# Patient Record
Sex: FEMALE | Race: WHITE | NOT HISPANIC OR LATINO
[De-identification: names, ages, dates, MRNs, and addresses within clinical notes are randomized per-mention and may not be internally consistent; named-entity substitution may affect disease eponyms.]

---

## 2017-01-09 ENCOUNTER — APPOINTMENT (OUTPATIENT)
Dept: CARDIOTHORACIC SURGERY | Facility: CLINIC | Age: 82
End: 2017-01-09

## 2017-01-09 ENCOUNTER — OUTPATIENT (OUTPATIENT)
Dept: OUTPATIENT SERVICES | Facility: HOSPITAL | Age: 82
LOS: 1 days | End: 2017-01-09
Payer: COMMERCIAL

## 2017-01-09 VITALS
SYSTOLIC BLOOD PRESSURE: 171 MMHG | DIASTOLIC BLOOD PRESSURE: 71 MMHG | RESPIRATION RATE: 17 BRPM | BODY MASS INDEX: 20.96 KG/M2 | TEMPERATURE: 97 F | HEIGHT: 59 IN | OXYGEN SATURATION: 98 % | WEIGHT: 104 LBS | HEART RATE: 66 BPM

## 2017-01-09 DIAGNOSIS — Z82.3 FAMILY HISTORY OF STROKE: ICD-10-CM

## 2017-01-09 DIAGNOSIS — R07.9 CHEST PAIN, UNSPECIFIED: ICD-10-CM

## 2017-01-09 DIAGNOSIS — I73.9 PERIPHERAL VASCULAR DISEASE, UNSPECIFIED: ICD-10-CM

## 2017-01-09 DIAGNOSIS — I25.10 ATHEROSCLEROTIC HEART DISEASE OF NATIVE CORONARY ARTERY W/OUT ANGINA PECTORIS: ICD-10-CM

## 2017-01-09 DIAGNOSIS — Z86.79 PERSONAL HISTORY OF OTHER DISEASES OF THE CIRCULATORY SYSTEM: ICD-10-CM

## 2017-01-09 DIAGNOSIS — Z78.9 OTHER SPECIFIED HEALTH STATUS: ICD-10-CM

## 2017-01-09 DIAGNOSIS — I50.32 CHRONIC DIASTOLIC (CONGESTIVE) HEART FAILURE: ICD-10-CM

## 2017-01-09 DIAGNOSIS — Z98.61 ATHEROSCLEROTIC HEART DISEASE OF NATIVE CORONARY ARTERY W/OUT ANGINA PECTORIS: ICD-10-CM

## 2017-01-09 DIAGNOSIS — Z86.39 PERSONAL HISTORY OF OTHER ENDOCRINE, NUTRITIONAL AND METABOLIC DISEASE: ICD-10-CM

## 2017-01-09 DIAGNOSIS — I35.0 NONRHEUMATIC AORTIC (VALVE) STENOSIS: ICD-10-CM

## 2017-01-09 PROCEDURE — 93306 TTE W/DOPPLER COMPLETE: CPT

## 2017-01-09 PROCEDURE — 93306 TTE W/DOPPLER COMPLETE: CPT | Mod: 26

## 2017-01-10 PROBLEM — I73.9 PERIPHERAL ARTERY DISEASE: Status: RESOLVED | Noted: 2017-01-10 | Resolved: 2017-01-10

## 2017-01-10 PROBLEM — Z86.79 HISTORY OF HYPERTENSION: Status: RESOLVED | Noted: 2017-01-10 | Resolved: 2017-01-10

## 2017-01-10 PROBLEM — I25.10 CAD S/P PERCUTANEOUS CORONARY ANGIOPLASTY: Status: RESOLVED | Noted: 2017-01-10 | Resolved: 2017-01-10

## 2017-01-10 PROBLEM — Z78.9 SOCIAL ALCOHOL USE: Status: ACTIVE | Noted: 2017-01-10

## 2017-01-10 PROBLEM — I35.0 AORTIC STENOSIS: Status: ACTIVE | Noted: 2017-01-10

## 2017-01-10 PROBLEM — I50.32 CHRONIC DIASTOLIC CONGESTIVE HEART FAILURE: Status: ACTIVE | Noted: 2017-01-10

## 2017-01-10 PROBLEM — Z82.3 FAMILY HISTORY OF CEREBROVASCULAR ACCIDENT (CVA): Status: ACTIVE | Noted: 2017-01-10

## 2017-01-10 PROBLEM — Z86.39 HISTORY OF HYPERLIPIDEMIA: Status: RESOLVED | Noted: 2017-01-10 | Resolved: 2017-01-10

## 2017-01-10 RX ORDER — METOPROLOL SUCCINATE 100 MG/1
100 TABLET, EXTENDED RELEASE ORAL DAILY
Qty: 60 | Refills: 2 | Status: ACTIVE | COMMUNITY

## 2017-01-10 RX ORDER — ASPIRIN 81 MG
81 TABLET, DELAYED RELEASE (ENTERIC COATED) ORAL DAILY
Qty: 30 | Refills: 0 | Status: ACTIVE | COMMUNITY

## 2017-01-10 RX ORDER — CLOPIDOGREL 75 MG/1
75 TABLET, FILM COATED ORAL
Refills: 0 | Status: ACTIVE | COMMUNITY

## 2017-01-10 RX ORDER — GABAPENTIN 100 MG
100 TABLET ORAL DAILY
Refills: 0 | Status: ACTIVE | COMMUNITY

## 2017-01-10 RX ORDER — FUROSEMIDE 40 MG/1
40 TABLET ORAL DAILY
Qty: 30 | Refills: 1 | Status: ACTIVE | COMMUNITY

## 2017-01-10 RX ORDER — ATORVASTATIN CALCIUM 10 MG/1
10 TABLET, FILM COATED ORAL DAILY
Refills: 0 | Status: ACTIVE | COMMUNITY

## 2017-01-10 RX ORDER — AMLODIPINE BESYLATE 2.5 MG/1
2.5 TABLET ORAL DAILY
Qty: 30 | Refills: 1 | Status: ACTIVE | COMMUNITY

## 2017-10-13 ENCOUNTER — APPOINTMENT (OUTPATIENT)
Dept: VASCULAR SURGERY | Facility: CLINIC | Age: 82
End: 2017-10-13
Payer: MEDICARE

## 2017-10-13 PROCEDURE — 99204 OFFICE O/P NEW MOD 45 MIN: CPT | Mod: 25

## 2017-10-13 PROCEDURE — 93880 EXTRACRANIAL BILAT STUDY: CPT

## 2018-03-02 VITALS
RESPIRATION RATE: 18 BRPM | WEIGHT: 104.06 LBS | OXYGEN SATURATION: 100 % | HEIGHT: 59 IN | SYSTOLIC BLOOD PRESSURE: 140 MMHG | HEART RATE: 70 BPM | DIASTOLIC BLOOD PRESSURE: 42 MMHG | TEMPERATURE: 98 F

## 2018-03-02 RX ORDER — CHLORHEXIDINE GLUCONATE 213 G/1000ML
1 SOLUTION TOPICAL ONCE
Qty: 0 | Refills: 0 | Status: DISCONTINUED | OUTPATIENT
Start: 2018-03-05 | End: 2018-03-06

## 2018-03-02 NOTE — H&P ADULT - PSH
S/P MVR (mitral valve repair) S/P appendectomy    S/P coronary artery stent placement  last @ [facility] 2012  S/P MVR (mitral valve repair)

## 2018-03-02 NOTE — H&P ADULT - NSHPLABSRESULTS_GEN_ALL_CORE
11.8   9.6   )-----------( 507      ( 05 Mar 2018 07:45 )             37.4   03-05    140  |  101  |  27<H>  ----------------------------<  106<H>  4.7   |  25  |  1.41<H>    Ca    10.4      05 Mar 2018 07:45      PT/INR - ( 05 Mar 2018 07:45 )   PT: 11.5 sec;   INR: 1.04          PTT - ( 05 Mar 2018 07:45 )  PTT:27.2 sec    EKG: NSR @ 64 BPM with LBBB and Left axis deviation

## 2018-03-02 NOTE — H&P ADULT - HISTORY OF PRESENT ILLNESS
SKELETON    89yo female with PMHx significant for HTN, hyperlipidemia, CAD, systolic CHF, TIA, Mitral Stenosis s/p MVR, CKD     Patient had LE arterial doppler 12/19/2016 that showed mild atherosclerosis disease throughout the lower extremities    Echocardiogram 12/15/2017 that showed LVEF 45%, moderate MR and AI, mild AS 1.4cm2, mitral valve repair 89yo female with PMHx significant for HTN, HLD, CAD (s/p 10 stents, last stent @ [facility] in 2012), PAD (s/p 2 previous stents to B/L LE) chronic systolic CHF, TIA, Mitral Stenosis s/p MVR, moderate MR and AI, CKD stage III (GFR 32, Cr 1.41 today) who presented to cardiologist with c/o progressively worsening B/L leg pain described as cramping and of 8/10 of intensity to ankles-->feet occurring independent of activity over past 1 year. Pt endorses unable to walk >1 minute without stopping due to pain. Of note: pt with recent hospitalization 12/2017 for non-healing, bleeding ulcer with 8/10 gnawing pain to left ankle that first developed 10/2017 which has persisted but pain has lessened. Futhermore, pt endorses intermittent, episodes of dizziness occuring at rest for years and increased fatigue over past few months. Pt denies color changes to B/L LE, CP, SOB, diaphoresis, orthopnea, palpitations, PND, N/V, syncope. Patient had LE arterial doppler 12/19/2016 that showed mild atherosclerosis disease throughout the lower extremities. Echocardiogram 12/15/2017 showed LVEF 45%, moderate MR and AI, mild AS 1.4cm2, mitral valve repair Due to pt's risk factors, Stage V [place] classification,  abnormal LE arterial doppler, pt is referred for peripheral angiogram with possible intervention. 89yo female with PMHx significant for HTN, HLD, CAD (s/p 10 stents, last stent @ [facility] in 2012), PAD (s/p 2 previous stents to B/L LE) chronic systolic CHF, TIA, Mitral Stenosis s/p MVR (11/2016 with 24 Medtronic CG future annuloplasty ring) , moderate MR and AI, mild AS (JESSE 1.4), CKD stage III (GFR 32, Cr 1.41 today) who presented to cardiologist with c/o progressively worsening B/L leg pain described as cramping and of 8/10 of intensity to ankles-->feet occurring independent of activity over past 1 year. Pt endorses unable to walk >1 minute without stopping due to pain. Of note: pt with recent hospitalization 12/2017 for non-healing, bleeding ulcer with 8/10 gnawing pain to left ankle that first developed 10/2017 which has persisted but pain has lessened. Futhermore, pt endorses intermittent, episodes of dizziness occurring at rest for years and increased fatigue over past few months. Pt denies color changes to B/L LE, CP, SOB, diaphoresis, orthopnea, palpitations, PND, N/V, syncope. Patient had LE arterial doppler 12/19/2016 that showed mild atherosclerosis disease throughout the lower extremities. Echocardiogram 12/15/2017 showed LVEF 45%, moderate MR and AI, mild AS 1.4cm2, mitral valve repair Due to pt's risk factors, Stage V [place] classification,  abnormal LE arterial doppler, pt is referred for peripheral angiogram with possible intervention. 89yo female with PMHx significant for HTN, HLD, CAD (s/p 10 stents, last stent @ [facility] in 2012 with PCI to RCA and LAD), PAD (s/p 2 previous stents to B/L LE) chronic systolic CHF, TIA, Mitral Stenosis s/p MVR (11/2016 with 24 Medtronic CG future annuloplasty ring) , moderate MR and AI, mild AS (JESSE 1.4), CKD stage III (GFR 32, Cr 1.41 today) who presented to cardiologist with c/o progressively worsening B/L leg pain described as cramping and of 8/10 of intensity to ankles-->feet occurring independent of activity over past 1 year. Pt endorses unable to walk >1 minute without stopping due to pain. Of note: pt with recent hospitalization 12/2017 for non-healing, bleeding ulcer with 8/10 gnawing pain to left ankle that first developed 10/2017 which has persisted but pain has lessened. Futhermore, pt endorses intermittent, episodes of dizziness occurring at rest for years and increased fatigue over past few months. Pt denies color changes to B/L LE, CP, SOB, diaphoresis, orthopnea, palpitations, PND, N/V, syncope. Patient had LE arterial doppler 12/19/2016 that showed mild atherosclerosis disease throughout the lower extremities. Echocardiogram 12/15/2017 showed LVEF 45%, moderate MR and AI, mild AS 1.4cm2, mitral valve repair Due to pt's risk factors, Stage V [place] classification,  abnormal LE arterial doppler, pt is referred for peripheral angiogram with possible intervention.     Cath history:  9/2012 @ [facility]: LLE:  patent common femoral, patent profunda,  distal SFA/popliteal stent occluded  s/p successful PTA , patent TP trunk with moderate disease, occluded AT, peroneal improvde flow at end of case after improvement of inflow, occluded PT. RLE: patent common femoral, mild to moderate disease tp profunda, moderate to severe m/d superficial femoral, severe disease to popliteal, severe disease to tibioperoneal trunk, severe disease/subtotal occlusion to AT, peroneal with severe disease/subtotal occlusion, occluded PT

## 2018-03-02 NOTE — H&P ADULT - ASSESSMENT
89yo female with PMHx significant for HTN, HLD, CAD (s/p 10 stents, last stent @ [facility] in 2012), PAD (s/p 2 previous stents to B/L LE) chronic systolic CHF, TIA, Mitral Stenosis s/p MVR, moderate MR and AI, mild AS, stage III (GFR 32, Cr 1.41 today) who presented to cardiologist with c/o progressively worsening B/L leg pain described as cramping and of 8/10 of intensity to ankles-->feet occurring independent of activity over past 1 year. Due to pt's risk factors, Stage V [place] classification,  abnormal LE arterial doppler, pt is referred for peripheral angiogram with possible intervention.     ASA III, Mallampati III    Creatinine 1.4 today; however pt with noted mild AS, cCHF (EF 45%). Pt euvolemic on exam. IV 1/2 NS @ 50 cc/hr with frequent lung checks.      Pt reports compliance with ASA, plavix. Took home 81 mg ASA today. Took 75 mg plavix on 3/4/18. Ordered for 75 mg plavix once pre-cath.    Sedation Plan: Moderate  Patient is Suitable Candidate for Sedation: Yes  Risks & benefits of procedure and alternative therapy have been explained to the patient including but not limited to: allergic reaction, bleeding w/possible need for blood transfusion, infection, renal and vascular compromise, limb damage, arrhythmia, stroke, vessel dissection/perforation, Myocardial infarction, emergent CABG. Informed consent obtained and in chart 89yo female with PMHx significant for HTN, HLD, CAD (s/p 10 stents, last stent @ [facility] in 2012), PAD (s/p 2 previous stents to B/L LE) chronic systolic CHF, TIA, Mitral Stenosis s/p MVR (11/2016 with 24 Medtronic CG future annuloplasty ring) , moderate MR and AI, mild AS (JESSE 1.4), CKD stage III (GFR 32, Cr 1.41 today) who presented to cardiologist with c/o progressively worsening B/L leg pain described as cramping and of 8/10 of intensity to ankles-->feet occurring independent of activity over past 1 year.  Due to pt's risk factors, Stage V [place] classification,  abnormal LE arterial doppler, pt is referred for peripheral angiogram with possible intervention.     ASA III, Mallampati III    Creatinine 1.4 today; however pt with noted mild AS, cCHF (EF 45%). Pt euvolemic on exam. IV 1/2 NS @ 50 cc/hr with frequent lung checks.      Pt reports compliance with ASA, plavix. Took home 81 mg ASA today. Took 75 mg plavix on 3/4/18. Ordered for 75 mg plavix once pre-cath.    Sedation Plan: Moderate  Patient is Suitable Candidate for Sedation: Yes  Risks & benefits of procedure and alternative therapy have been explained to the patient including but not limited to: allergic reaction, bleeding w/possible need for blood transfusion, infection, renal and vascular compromise, limb damage, arrhythmia, stroke, vessel dissection/perforation, Myocardial infarction, emergent CABG. Informed consent obtained and in chart 89yo female with PMHx significant for HTN, HLD, CAD (s/p 10 stents, last stent @ [facility] in 2012 with PCI to RCA and LAD), PAD (s/p 2 previous stents to B/L LE) chronic systolic CHF, TIA, Mitral Stenosis s/p MVR (11/2016 with 24 Medtronic CG future annuloplasty ring) , moderate MR and AI, mild AS (JESSE 1.4), CKD stage III (GFR 32, Cr 1.41 today) who presented to cardiologist with c/o progressively worsening B/L leg pain described as cramping and of 8/10 of intensity to ankles-->feet occurring independent of activity over past 1 year.  Due to pt's risk factors, Stage V [place] classification,  abnormal LE arterial doppler, pt is referred for peripheral angiogram with possible intervention.     ASA III, Mallampati III    Creatinine 1.4 today; however pt with noted mild AS, cCHF (EF 45%). Pt euvolemic on exam. IV 1/2 NS @ 50 cc/hr with frequent lung checks.      Pt reports compliance with ASA, plavix. Took home 81 mg ASA today. Took 75 mg plavix on 3/4/18. Ordered for 75 mg plavix once pre-cath.    Sedation Plan: Moderate  Patient is Suitable Candidate for Sedation: Yes  Risks & benefits of procedure and alternative therapy have been explained to the patient including but not limited to: allergic reaction, bleeding w/possible need for blood transfusion, infection, renal and vascular compromise, limb damage, arrhythmia, stroke, vessel dissection/perforation, Myocardial infarction, emergent CABG. Informed consent obtained and in chart

## 2018-03-02 NOTE — H&P ADULT - PMH
HTN (hypertension)    Hyperlipidemia    Mitral stenosis    PAD (peripheral artery disease)    TIA (transient ischemic attack) CAD (coronary artery disease)    HTN (hypertension)    Hyperlipidemia    Mitral stenosis    PAD (peripheral artery disease)    TIA (transient ischemic attack)

## 2018-03-05 ENCOUNTER — INPATIENT (INPATIENT)
Facility: HOSPITAL | Age: 83
LOS: 0 days | Discharge: HOME HEALTH | DRG: 271 | End: 2018-03-06
Attending: INTERNAL MEDICINE | Admitting: INTERNAL MEDICINE
Payer: MEDICARE

## 2018-03-05 DIAGNOSIS — Z98.890 OTHER SPECIFIED POSTPROCEDURAL STATES: Chronic | ICD-10-CM

## 2018-03-05 DIAGNOSIS — Z90.49 ACQUIRED ABSENCE OF OTHER SPECIFIED PARTS OF DIGESTIVE TRACT: Chronic | ICD-10-CM

## 2018-03-05 DIAGNOSIS — Z95.5 PRESENCE OF CORONARY ANGIOPLASTY IMPLANT AND GRAFT: Chronic | ICD-10-CM

## 2018-03-05 LAB
ANION GAP SERPL CALC-SCNC: 14 MMOL/L — SIGNIFICANT CHANGE UP (ref 5–17)
APTT BLD: 27.2 SEC — LOW (ref 27.5–37.4)
BASOPHILS NFR BLD AUTO: 0.6 % — SIGNIFICANT CHANGE UP (ref 0–2)
BUN SERPL-MCNC: 27 MG/DL — HIGH (ref 7–23)
CALCIUM SERPL-MCNC: 10.4 MG/DL — SIGNIFICANT CHANGE UP (ref 8.4–10.5)
CHLORIDE SERPL-SCNC: 101 MMOL/L — SIGNIFICANT CHANGE UP (ref 96–108)
CO2 SERPL-SCNC: 25 MMOL/L — SIGNIFICANT CHANGE UP (ref 22–31)
CREAT SERPL-MCNC: 1.41 MG/DL — HIGH (ref 0.5–1.3)
EOSINOPHIL NFR BLD AUTO: 5.2 % — SIGNIFICANT CHANGE UP (ref 0–6)
GLUCOSE SERPL-MCNC: 106 MG/DL — HIGH (ref 70–99)
HCT VFR BLD CALC: 37.4 % — SIGNIFICANT CHANGE UP (ref 34.5–45)
HGB BLD-MCNC: 11.8 G/DL — SIGNIFICANT CHANGE UP (ref 11.5–15.5)
INR BLD: 1.04 — SIGNIFICANT CHANGE UP (ref 0.88–1.16)
LYMPHOCYTES # BLD AUTO: 21.4 % — SIGNIFICANT CHANGE UP (ref 13–44)
MCHC RBC-ENTMCNC: 26.8 PG — LOW (ref 27–34)
MCHC RBC-ENTMCNC: 31.6 G/DL — LOW (ref 32–36)
MCV RBC AUTO: 84.8 FL — SIGNIFICANT CHANGE UP (ref 80–100)
MONOCYTES NFR BLD AUTO: 9.9 % — SIGNIFICANT CHANGE UP (ref 2–14)
NEUTROPHILS NFR BLD AUTO: 62.9 % — SIGNIFICANT CHANGE UP (ref 43–77)
PLATELET # BLD AUTO: 507 K/UL — HIGH (ref 150–400)
POTASSIUM SERPL-MCNC: 4.7 MMOL/L — SIGNIFICANT CHANGE UP (ref 3.5–5.3)
POTASSIUM SERPL-SCNC: 4.7 MMOL/L — SIGNIFICANT CHANGE UP (ref 3.5–5.3)
PROTHROM AB SERPL-ACNC: 11.5 SEC — SIGNIFICANT CHANGE UP (ref 9.8–12.7)
RBC # BLD: 4.41 M/UL — SIGNIFICANT CHANGE UP (ref 3.8–5.2)
RBC # FLD: 14.5 % — SIGNIFICANT CHANGE UP (ref 10.3–16.9)
SODIUM SERPL-SCNC: 140 MMOL/L — SIGNIFICANT CHANGE UP (ref 135–145)
WBC # BLD: 9.6 K/UL — SIGNIFICANT CHANGE UP (ref 3.8–10.5)
WBC # FLD AUTO: 9.6 K/UL — SIGNIFICANT CHANGE UP (ref 3.8–10.5)

## 2018-03-05 PROCEDURE — 93010 ELECTROCARDIOGRAM REPORT: CPT

## 2018-03-05 PROCEDURE — 37225: CPT

## 2018-03-05 RX ORDER — METOPROLOL TARTRATE 50 MG
100 TABLET ORAL DAILY
Qty: 0 | Refills: 0 | Status: DISCONTINUED | OUTPATIENT
Start: 2018-03-05 | End: 2018-03-06

## 2018-03-05 RX ORDER — CLOPIDOGREL BISULFATE 75 MG/1
75 TABLET, FILM COATED ORAL ONCE
Qty: 0 | Refills: 0 | Status: COMPLETED | OUTPATIENT
Start: 2018-03-05 | End: 2018-03-05

## 2018-03-05 RX ORDER — ASPIRIN/CALCIUM CARB/MAGNESIUM 324 MG
81 TABLET ORAL DAILY
Qty: 0 | Refills: 0 | Status: DISCONTINUED | OUTPATIENT
Start: 2018-03-06 | End: 2018-03-06

## 2018-03-05 RX ORDER — AMLODIPINE BESYLATE 2.5 MG/1
1 TABLET ORAL
Qty: 0 | Refills: 0 | COMMUNITY

## 2018-03-05 RX ORDER — GABAPENTIN 400 MG/1
1 CAPSULE ORAL
Qty: 0 | Refills: 0 | COMMUNITY

## 2018-03-05 RX ORDER — FENTANYL CITRATE 50 UG/ML
25 INJECTION INTRAVENOUS ONCE
Qty: 0 | Refills: 0 | Status: DISCONTINUED | OUTPATIENT
Start: 2018-03-05 | End: 2018-03-05

## 2018-03-05 RX ORDER — AMLODIPINE BESYLATE 2.5 MG/1
2.5 TABLET ORAL DAILY
Qty: 0 | Refills: 0 | Status: DISCONTINUED | OUTPATIENT
Start: 2018-03-05 | End: 2018-03-06

## 2018-03-05 RX ORDER — SODIUM CHLORIDE 9 MG/ML
500 INJECTION INTRAMUSCULAR; INTRAVENOUS; SUBCUTANEOUS
Qty: 0 | Refills: 0 | Status: DISCONTINUED | OUTPATIENT
Start: 2018-03-05 | End: 2018-03-06

## 2018-03-05 RX ORDER — ATORVASTATIN CALCIUM 80 MG/1
40 TABLET, FILM COATED ORAL AT BEDTIME
Qty: 0 | Refills: 0 | Status: DISCONTINUED | OUTPATIENT
Start: 2018-03-05 | End: 2018-03-06

## 2018-03-05 RX ORDER — CLOPIDOGREL BISULFATE 75 MG/1
75 TABLET, FILM COATED ORAL DAILY
Qty: 0 | Refills: 0 | Status: DISCONTINUED | OUTPATIENT
Start: 2018-03-06 | End: 2018-03-06

## 2018-03-05 RX ORDER — METOPROLOL TARTRATE 50 MG
1 TABLET ORAL
Qty: 0 | Refills: 0 | COMMUNITY

## 2018-03-05 RX ORDER — FUROSEMIDE 40 MG
1 TABLET ORAL
Qty: 0 | Refills: 0 | COMMUNITY

## 2018-03-05 RX ORDER — SODIUM CHLORIDE 9 MG/ML
500 INJECTION, SOLUTION INTRAVENOUS
Qty: 0 | Refills: 0 | Status: DISCONTINUED | OUTPATIENT
Start: 2018-03-05 | End: 2018-03-05

## 2018-03-05 RX ORDER — ATORVASTATIN CALCIUM 80 MG/1
1 TABLET, FILM COATED ORAL
Qty: 0 | Refills: 0 | COMMUNITY

## 2018-03-05 RX ORDER — FENOFIBRATE,MICRONIZED 130 MG
1 CAPSULE ORAL
Qty: 0 | Refills: 0 | COMMUNITY

## 2018-03-05 RX ORDER — SODIUM CHLORIDE 9 MG/ML
500 INJECTION INTRAMUSCULAR; INTRAVENOUS; SUBCUTANEOUS
Qty: 0 | Refills: 0 | Status: DISCONTINUED | OUTPATIENT
Start: 2018-03-05 | End: 2018-03-05

## 2018-03-05 RX ORDER — GABAPENTIN 400 MG/1
100 CAPSULE ORAL DAILY
Qty: 0 | Refills: 0 | Status: DISCONTINUED | OUTPATIENT
Start: 2018-03-05 | End: 2018-03-06

## 2018-03-05 RX ORDER — FUROSEMIDE 40 MG
40 TABLET ORAL DAILY
Qty: 0 | Refills: 0 | Status: DISCONTINUED | OUTPATIENT
Start: 2018-03-05 | End: 2018-03-06

## 2018-03-05 RX ADMIN — SODIUM CHLORIDE 75 MILLILITER(S): 9 INJECTION INTRAMUSCULAR; INTRAVENOUS; SUBCUTANEOUS at 15:51

## 2018-03-05 RX ADMIN — ATORVASTATIN CALCIUM 40 MILLIGRAM(S): 80 TABLET, FILM COATED ORAL at 21:07

## 2018-03-05 RX ADMIN — SODIUM CHLORIDE 50 MILLILITER(S): 9 INJECTION, SOLUTION INTRAVENOUS at 08:51

## 2018-03-05 RX ADMIN — FENTANYL CITRATE 25 MICROGRAM(S): 50 INJECTION INTRAVENOUS at 15:00

## 2018-03-05 RX ADMIN — FENTANYL CITRATE 25 MICROGRAM(S): 50 INJECTION INTRAVENOUS at 14:45

## 2018-03-05 RX ADMIN — CLOPIDOGREL BISULFATE 75 MILLIGRAM(S): 75 TABLET, FILM COATED ORAL at 08:51

## 2018-03-05 NOTE — PROGRESS NOTE ADULT - SUBJECTIVE AND OBJECTIVE BOX
Interventional Cardiology Event Note    At 14:25 PA notified of bleeding from L tibial pressure dressing. Dr. Frost and LOBO Porter also called to bedside. Pressure dressing removed and oozing from multiple sticks to dorsalis pedis. Dorsalis pedis pulse was dopplerable. Manual pressure applied for 15 minutes with resolution of oozing. New pressure dressing applied to Left LE. Pt also endorsing 9/10 pain/discomfort in back, L shin, and L ankle. Fentanyl 25 mg IV x1 given per Dr. Frost. Interventional Cardiology Event Note    At 14:25 PA notified of bleeding from L tibial pressure dressing. Dr. Frost and PA German also called to bedside. Pressure dressing removed and oozing from multiple sticks to dorsalis pedis. Dorsalis pedis pulse was dopplerable. Posterior tibial not dopplerable, but also not present during procedure. Manual pressure applied for 15 minutes with resolution of oozing. New pressure dressing applied to Left LE. Pt also endorsing 9/10 pain/discomfort in back, L shin, and L ankle. Fentanyl 25 mg IV x1 given per Dr. Frost.

## 2018-03-06 ENCOUNTER — TRANSCRIPTION ENCOUNTER (OUTPATIENT)
Age: 83
End: 2018-03-06

## 2018-03-06 VITALS — SYSTOLIC BLOOD PRESSURE: 138 MMHG | DIASTOLIC BLOOD PRESSURE: 60 MMHG | RESPIRATION RATE: 17 BRPM | HEART RATE: 80 BPM

## 2018-03-06 LAB
ANION GAP SERPL CALC-SCNC: 9 MMOL/L — SIGNIFICANT CHANGE UP (ref 5–17)
BUN SERPL-MCNC: 21 MG/DL — SIGNIFICANT CHANGE UP (ref 7–23)
CALCIUM SERPL-MCNC: 9 MG/DL — SIGNIFICANT CHANGE UP (ref 8.4–10.5)
CHLORIDE SERPL-SCNC: 102 MMOL/L — SIGNIFICANT CHANGE UP (ref 96–108)
CO2 SERPL-SCNC: 24 MMOL/L — SIGNIFICANT CHANGE UP (ref 22–31)
CREAT SERPL-MCNC: 1.13 MG/DL — SIGNIFICANT CHANGE UP (ref 0.5–1.3)
GLUCOSE SERPL-MCNC: 117 MG/DL — HIGH (ref 70–99)
HCT VFR BLD CALC: 24.8 % — LOW (ref 34.5–45)
HCT VFR BLD CALC: 24.9 % — LOW (ref 34.5–45)
HGB BLD-MCNC: 7.6 G/DL — LOW (ref 11.5–15.5)
HGB BLD-MCNC: 7.9 G/DL — LOW (ref 11.5–15.5)
MAGNESIUM SERPL-MCNC: 2 MG/DL — SIGNIFICANT CHANGE UP (ref 1.6–2.6)
MCHC RBC-ENTMCNC: 25.9 PG — LOW (ref 27–34)
MCHC RBC-ENTMCNC: 26.7 PG — LOW (ref 27–34)
MCHC RBC-ENTMCNC: 30.6 G/DL — LOW (ref 32–36)
MCHC RBC-ENTMCNC: 31.7 G/DL — LOW (ref 32–36)
MCV RBC AUTO: 84.1 FL — SIGNIFICANT CHANGE UP (ref 80–100)
MCV RBC AUTO: 84.6 FL — SIGNIFICANT CHANGE UP (ref 80–100)
PLATELET # BLD AUTO: 334 K/UL — SIGNIFICANT CHANGE UP (ref 150–400)
PLATELET # BLD AUTO: 344 K/UL — SIGNIFICANT CHANGE UP (ref 150–400)
POTASSIUM SERPL-MCNC: 4.9 MMOL/L — SIGNIFICANT CHANGE UP (ref 3.5–5.3)
POTASSIUM SERPL-SCNC: 4.9 MMOL/L — SIGNIFICANT CHANGE UP (ref 3.5–5.3)
RBC # BLD: 2.93 M/UL — LOW (ref 3.8–5.2)
RBC # BLD: 2.96 M/UL — LOW (ref 3.8–5.2)
RBC # FLD: 14 % — SIGNIFICANT CHANGE UP (ref 10.3–16.9)
RBC # FLD: 14.1 % — SIGNIFICANT CHANGE UP (ref 10.3–16.9)
SODIUM SERPL-SCNC: 135 MMOL/L — SIGNIFICANT CHANGE UP (ref 135–145)
WBC # BLD: 10.5 K/UL — SIGNIFICANT CHANGE UP (ref 3.8–10.5)
WBC # BLD: 10.8 K/UL — HIGH (ref 3.8–10.5)
WBC # FLD AUTO: 10.5 K/UL — SIGNIFICANT CHANGE UP (ref 3.8–10.5)
WBC # FLD AUTO: 10.8 K/UL — HIGH (ref 3.8–10.5)

## 2018-03-06 PROCEDURE — 85730 THROMBOPLASTIN TIME PARTIAL: CPT

## 2018-03-06 PROCEDURE — 86850 RBC ANTIBODY SCREEN: CPT

## 2018-03-06 PROCEDURE — C1887: CPT

## 2018-03-06 PROCEDURE — 85025 COMPLETE CBC W/AUTO DIFF WBC: CPT

## 2018-03-06 PROCEDURE — 36415 COLL VENOUS BLD VENIPUNCTURE: CPT

## 2018-03-06 PROCEDURE — 86901 BLOOD TYPING SEROLOGIC RH(D): CPT

## 2018-03-06 PROCEDURE — 85027 COMPLETE CBC AUTOMATED: CPT

## 2018-03-06 PROCEDURE — 93005 ELECTROCARDIOGRAM TRACING: CPT

## 2018-03-06 PROCEDURE — 83735 ASSAY OF MAGNESIUM: CPT

## 2018-03-06 PROCEDURE — C1885: CPT

## 2018-03-06 PROCEDURE — 97161 PT EVAL LOW COMPLEX 20 MIN: CPT

## 2018-03-06 PROCEDURE — C1769: CPT

## 2018-03-06 PROCEDURE — C1894: CPT

## 2018-03-06 PROCEDURE — 86900 BLOOD TYPING SEROLOGIC ABO: CPT

## 2018-03-06 PROCEDURE — 80048 BASIC METABOLIC PNL TOTAL CA: CPT

## 2018-03-06 PROCEDURE — 99238 HOSP IP/OBS DSCHRG MGMT 30/<: CPT

## 2018-03-06 PROCEDURE — C1725: CPT

## 2018-03-06 PROCEDURE — 85610 PROTHROMBIN TIME: CPT

## 2018-03-06 RX ORDER — ASPIRIN/CALCIUM CARB/MAGNESIUM 324 MG
1 TABLET ORAL
Qty: 30 | Refills: 11 | OUTPATIENT
Start: 2018-03-06 | End: 2019-02-28

## 2018-03-06 RX ORDER — FERROUS SULFATE 325(65) MG
1 TABLET ORAL
Qty: 30 | Refills: 0 | OUTPATIENT
Start: 2018-03-06 | End: 2018-04-04

## 2018-03-06 RX ORDER — ASPIRIN/CALCIUM CARB/MAGNESIUM 324 MG
1 TABLET ORAL
Qty: 0 | Refills: 0 | COMMUNITY

## 2018-03-06 RX ORDER — CLOPIDOGREL BISULFATE 75 MG/1
1 TABLET, FILM COATED ORAL
Qty: 30 | Refills: 11 | OUTPATIENT
Start: 2018-03-06 | End: 2019-02-28

## 2018-03-06 RX ORDER — CLOPIDOGREL BISULFATE 75 MG/1
1 TABLET, FILM COATED ORAL
Qty: 0 | Refills: 0 | COMMUNITY

## 2018-03-06 RX ORDER — DOCUSATE SODIUM 100 MG
1 CAPSULE ORAL
Qty: 30 | Refills: 0 | OUTPATIENT
Start: 2018-03-06 | End: 2018-04-04

## 2018-03-06 RX ORDER — VORAPAXAR 2.08 MG/1
1 TABLET, FILM COATED ORAL
Qty: 0 | Refills: 0 | COMMUNITY

## 2018-03-06 RX ADMIN — AMLODIPINE BESYLATE 2.5 MILLIGRAM(S): 2.5 TABLET ORAL at 07:04

## 2018-03-06 RX ADMIN — Medication 100 MILLIGRAM(S): at 07:04

## 2018-03-06 RX ADMIN — CLOPIDOGREL BISULFATE 75 MILLIGRAM(S): 75 TABLET, FILM COATED ORAL at 11:17

## 2018-03-06 RX ADMIN — Medication 40 MILLIGRAM(S): at 07:04

## 2018-03-06 RX ADMIN — Medication 81 MILLIGRAM(S): at 11:17

## 2018-03-06 RX ADMIN — GABAPENTIN 100 MILLIGRAM(S): 400 CAPSULE ORAL at 11:17

## 2018-03-06 NOTE — DISCHARGE NOTE ADULT - [facility]  [facility]  may be delayed due to impending bad weather forcasted fro 3/7- 3/8/18

## 2018-03-06 NOTE — PHYSICAL THERAPY INITIAL EVALUATION ADULT - ADDITIONAL COMMENTS
Patient lives alone in elevator apartment building with 4 steps to enter, has home health aid most of the day. Patient lives alone in elevator apartment building with 4 steps to enter, has home health aid 6 hours x 3 days (private pay) most of the day.

## 2018-03-06 NOTE — DISCHARGE NOTE ADULT - MEDICATION SUMMARY - MEDICATIONS TO STOP TAKING
I will STOP taking the medications listed below when I get home from the hospital:    vorapaxar 2.08 mg oral tablet  -- 1 tab(s) by mouth once a day

## 2018-03-06 NOTE — DISCHARGE NOTE ADULT - PLAN OF CARE
please continue to take your aspirin 81 mg daily, plavix 75 mg daily. Please do not take your vorapaxar until you are instructed to do by Dr. Potts. You underwent a peripheral catheterization and the blockage in your Left superficial femoral artery and peroneal artery was opened with balloon placement.  NEVER MISS A DOSE OF ASPIRIN OR PLAVIX; IF YOU DO, YOU ARE AT RISK OF YOUR STENT CLOSING AND HAVING A HEART ATTACK. DO NOT STOP THESE TWO MEDICATIONS UNLESS INSTRUCTED TO DO SO BY YOUR CARDIOLOGIST please continue to take your aspirin 81 mg daily and plavix 75 mg daily. please continue to take your amlodipine 2.5 mg daily, metoprolol  mg daily please continue to take your atorvastatin 40 mg daily. please take ferrous sulfate 325 mg daily and colace daily. your blood work this morning revealed low red blood cell count. This could be due to the fluids we hydrated you with or iron deficiency anemia or a slow bleed in your stomach.  you are however asymptomatic; even though the blood level low, it is currently stable at 7.9. please take the ferrous sulfate 325 mg daily which has been prescribed to you and follow up with Dr. Potts in 3/8/18 @ 1 pm and have a repeat blood test. Depending on the blood results then, you might have to undergo further Gastrointestinal workup. Please do not miss your appointment with Dr. Potts.    if you notice any blood in the urine, stool, if you are short of breath, fatigued, have dizziness/lightheadedness/headache, weakness, have a fast heart rate/palpitations, please contact your provider and go to the nearest emergency room. please continue to take your atorvastatin 40 mg daily and fenofibrate.

## 2018-03-06 NOTE — DISCHARGE NOTE ADULT - MEDICATION SUMMARY - MEDICATIONS TO TAKE
I will START or STAY ON the medications listed below when I get home from the hospital:    Ecotrin Adult Low Strength 81 mg oral delayed release tablet  -- 1 tab(s) by mouth once a day  -- Indication: For Stent/balloon    gabapentin 100 mg oral tablet  -- 1 tab(s) by mouth once a day  -- Indication: For PAin    atorvastatin 40 mg oral tablet  -- 1 tab(s) by mouth once a day  -- Indication: For Cholesterol    fenofibrate 48 mg oral tablet  -- 1 tab(s) by mouth 2 times a week (Wednesday, Saturday)  -- Indication: For Cholesterol    Plavix 75 mg oral tablet  -- 1 tab(s) by mouth once a day  -- Indication: For Stent/balloon    Metoprolol Succinate  mg oral tablet, extended release  -- 1 tab(s) by mouth once a day  -- Indication: For blood pressure    amLODIPine 2.5 mg oral tablet  -- 1 tab(s) by mouth once a day  -- Indication: For blood pressure    Lasix 40 mg oral tablet  -- 1 tab(s) by mouth once a day  -- Indication: For Heart I will START or STAY ON the medications listed below when I get home from the hospital:    Ecotrin Adult Low Strength 81 mg oral delayed release tablet  -- 1 tab(s) by mouth once a day  -- Indication: For Stent/balloon    gabapentin 100 mg oral tablet  -- 1 tab(s) by mouth once a day  -- Indication: For PAin    atorvastatin 40 mg oral tablet  -- 1 tab(s) by mouth once a day  -- Indication: For Cholesterol    fenofibrate 48 mg oral tablet  -- 1 tab(s) by mouth 2 times a week (Wednesday, Saturday)  -- Indication: For Cholesterol    Plavix 75 mg oral tablet  -- 1 tab(s) by mouth once a day  -- Indication: For Stent/balloon    Metoprolol Succinate  mg oral tablet, extended release  -- 1 tab(s) by mouth once a day  -- Indication: For blood pressure    amLODIPine 2.5 mg oral tablet  -- 1 tab(s) by mouth once a day  -- Indication: For blood pressure    Lasix 40 mg oral tablet  -- 1 tab(s) by mouth once a day  -- Indication: For Heart    ferrous sulfate 325 mg (65 mg elemental iron) oral delayed release tablet  -- 1 tab(s) by mouth once a day   -- May discolor urine or feces.  Swallow whole.  Do not crush.    -- Indication: For Supplemental    Colace 50 mg oral capsule  -- 1 cap(s) by mouth once a day (at bedtime)   -- Medication should be taken with plenty of water.    -- Indication: For CAD

## 2018-03-06 NOTE — DISCHARGE NOTE ADULT - PROVIDER TOKENS
FREE:[LAST:[cheyenne],FIRST:[martina],PHONE:[phone]],FAX:[phone]   [phone]    -],ADDRESS:[[address] [place] [address]]]

## 2018-03-06 NOTE — DISCHARGE NOTE ADULT - PATIENT PORTAL LINK FT
You can access the [facility] Patient Portal, offered by [facility], by registering with the following website: [email or web address]

## 2018-03-06 NOTE — PHYSICAL THERAPY INITIAL EVALUATION ADULT - CRITERIA FOR SKILLED THERAPEUTIC INTERVENTIONS
rehab potential/functional limitations in following categories/therapy frequency/anticipated discharge recommendation/impairments found

## 2018-03-06 NOTE — PHYSICAL THERAPY INITIAL EVALUATION ADULT - REFERRING PHYSICIAN, REHAB EVAL
89 year old female who presented to cardiologist with c/o progressively worsening B/L leg pain described as cramping and of 8/10 of intensity to ankles-->feet occurring independent of activity over past 1 year. Pt endorses unable to walk >1 minute without stopping due to pain. Of note: pt with recent hospitalization 12/2017 for non-healing, bleeding ulcer with 8/10 gnawing pain to left ankle that first developed 10/2017 which has persisted but pain has lessened.

## 2018-03-06 NOTE — DISCHARGE NOTE ADULT - HOSPITAL COURSE
... 89yo female with PMHx significant for HTN, HLD, CAD (s/p 10 stents, last stent @ [facility] in 2012 with PCI to RCA and LAD), PAD (s/p 2 previous stents to B/L LE) chronic systolic CHF, TIA, Mitral Stenosis s/p MVR (11/2016 with 24 Medtronic CG future annuloplasty ring) , moderate MR and AI, mild AS (JESSE 1.4), CKD stage III (GFR 32, Cr 1.41 today) who presented to cardiologist with c/o progressively worsening B/L leg pain described as cramping and of 8/10 of intensity to ankles-->feet occurring independent of activity over past 1 year. Pt endorses unable to walk >1 minute without stopping due to pain. Of note: pt with recent hospitalization 12/2017 for non-healing, bleeding ulcer with 8/10 gnawing pain to left ankle that first developed 10/2017 which has persisted but pain has lessened. Futhermore, pt endorses intermittent, episodes of dizziness occurring at rest for years and increased fatigue over past few months. Pt denies color changes to B/L LE, CP, SOB, diaphoresis, orthopnea, palpitations, PND, N/V, syncope. Patient had LE arterial doppler 12/19/2016 that showed mild atherosclerosis disease throughout the lower extremities. Echocardiogram 12/15/2017 showed LVEF 45%, moderate MR and AI, mild AS 1.4cm2, mitral valve repair Due to pt's risk factors, Stage V [place] classification,  abnormal LE arterial doppler, pt is referred for peripheral angiogram with possible intervention. Pt. s/p peripheral cath 3/5/2018: LSFA 100% occlusion s/p PTCA and laser artherectomy, L peroneal 100% occlusion s/p PTCA EF 45% by ECHO 12/15/17, mod MR and AI, mild AS, RCFA 5F sheath pulled with manual pressure applied in procedure room, L peroneal 5F sheath pulled with manual pressure applied in procedure room. Post procedure, PA notified of bleeding from L tibial pressure dressing. Dr. Frost and LOBO Porter also called to bedside. Pressure dressing removed and oozing from multiple sticks to dorsalis pedis. Dorsalis pedis pulse was dopplerable. Posterior tibial not dopplerable, but also not present during procedure. Manual pressure applied for 15 minutes with resolution of oozing. New pressure dressing applied to Left LE. Pt also endorsing 9/10 pain/discomfort in back, L shin, and L ankle. Fentanyl 25 mg IV x1 given per Dr. Frost. Pt. now admitted to 5 uris for further monitoring. Pt. being hydrated with NS @ 75 cc/hr X 8 hr post. Pt. seen and examined at bedside this am. Pt. comfortable, denies any CP, SOB, dizziness, palpitations, N/V, back pain, dizziness, weakness, fatigue. Labs reviwed this am, Hg/Ht 7.6/24.8 which has dropped from 11.8/24.9, pt.s VSS, asymptomatic, repeat CBC drawn revealed hg/Ht 7.9/24.9. Dr. Geiger and Dr. Frost aware, Dr. Frost PA came at bedside, pt. R groin stable, no bleeding and hematoma noted, L peroneal site stable, no bleeding and hematoma noted. R and L DP Doppler, posterior tibial not dopplerable, but also not present during procedure. Pt. denies any numbness, tingling in LE B/L, pt. reports on mild pain in L extremity which has been present since pre-procedure. Pt. stable to be d/c as per Dr. Frost Pt. made an appointment to see Dr. Potts on 3/8/18 @ 1 pm, to have an repeat CBC for possible GI workup.. Pt. discharged home with Ferrous sulfate and suggested to contact Dr. Potts or her PCP, if she becomes symptomatic and go to the nearest emergency room. Cr 1.18 at d/c, 1.41 on arrival. Home meds reviwed with Dr. Khalil and pt. stable to be d/c on ASA/Plavix, atorvastatin 40 daily, furosemide 40 daily, Metoprolol  mg daily, gabapentin 100 mg daily , amlodipine 2.5 mg daily, fenofibrate 48 mg (every wed and Saturday) and to stop taking Voraxapan unless suggested by Dr. Potts on 3/8/18. Pt. stable to be d/c as per Dr. Khalil and to f/u with Dr. Potts.

## 2018-03-06 NOTE — DISCHARGE NOTE ADULT - CARE PROVIDER_API CALL
martina quinn  [address] [place] [id number]  Phone: [phone]  Fax: (   )    -

## 2018-03-06 NOTE — DISCHARGE NOTE ADULT - CARE PLAN
Principal Discharge DX:	PAD (peripheral artery disease)  Goal:	please continue to take your aspirin 81 mg daily, plavix 75 mg daily. Please do not take your vorapaxar until you are instructed to do by Dr. Potts.  Assessment and plan of treatment:	You underwent a peripheral catheterization and the blockage in your Left superficial femoral artery and peroneal artery was opened with balloon placement.  NEVER MISS A DOSE OF ASPIRIN OR PLAVIX; IF YOU DO, YOU ARE AT RISK OF YOUR STENT CLOSING AND HAVING A HEART ATTACK. DO NOT STOP THESE TWO MEDICATIONS UNLESS INSTRUCTED TO DO SO BY YOUR CARDIOLOGIST  Secondary Diagnosis:	CAD (coronary artery disease)  Goal:	please continue to take your aspirin 81 mg daily and plavix 75 mg daily.  Secondary Diagnosis:	HTN (hypertension)  Goal:	please continue to take your amlodipine 2.5 mg daily, metoprolol  mg daily  Secondary Diagnosis:	Hyperlipidemia  Goal:	please continue to take your atorvastatin 40 mg daily.  Secondary Diagnosis:	Anemia  Goal:	please take ferrous sulfate 325 mg daily and colace daily.  Assessment and plan of treatment:	your blood work this morning revealed low red blood cell count. This could be due to the fluids we hydrated you with or iron deficiency anemia or a slow bleed in your stomach.  you are however asymptomatic; even though the blood level low, it is currently stable at 7.9. please take the ferrous sulfate 325 mg daily which has been prescribed to you and follow up with Dr. Potts in 3/8/18 @ 1 pm and have a repeat blood test. Depending on the blood results then, you might have to undergo further Gastrointestinal workup. Please do not miss your appointment with Dr. Potts.    if you notice any blood in the urine, stool, if you are short of breath, fatigued, have dizziness/lightheadedness/headache, weakness, have a fast heart rate/palpitations, please contact your provider and go to the nearest emergency room. Principal Discharge DX:	PAD (peripheral artery disease)  Goal:	please continue to take your aspirin 81 mg daily, plavix 75 mg daily. Please do not take your vorapaxar until you are instructed to do by Dr. Potts.  Assessment and plan of treatment:	You underwent a peripheral catheterization and the blockage in your Left superficial femoral artery and peroneal artery was opened with balloon placement.  NEVER MISS A DOSE OF ASPIRIN OR PLAVIX; IF YOU DO, YOU ARE AT RISK OF YOUR STENT CLOSING AND HAVING A HEART ATTACK. DO NOT STOP THESE TWO MEDICATIONS UNLESS INSTRUCTED TO DO SO BY YOUR CARDIOLOGIST  Secondary Diagnosis:	CAD (coronary artery disease)  Goal:	please continue to take your aspirin 81 mg daily and plavix 75 mg daily.  Secondary Diagnosis:	HTN (hypertension)  Goal:	please continue to take your amlodipine 2.5 mg daily, metoprolol  mg daily  Secondary Diagnosis:	Hyperlipidemia  Goal:	please continue to take your atorvastatin 40 mg daily and fenofibrate.  Secondary Diagnosis:	Anemia  Goal:	please take ferrous sulfate 325 mg daily and colace daily.  Assessment and plan of treatment:	your blood work this morning revealed low red blood cell count. This could be due to the fluids we hydrated you with or iron deficiency anemia or a slow bleed in your stomach.  you are however asymptomatic; even though the blood level low, it is currently stable at 7.9. please take the ferrous sulfate 325 mg daily which has been prescribed to you and follow up with Dr. Potts in 3/8/18 @ 1 pm and have a repeat blood test. Depending on the blood results then, you might have to undergo further Gastrointestinal workup. Please do not miss your appointment with Dr. Potts.    if you notice any blood in the urine, stool, if you are short of breath, fatigued, have dizziness/lightheadedness/headache, weakness, have a fast heart rate/palpitations, please contact your provider and go to the nearest emergency room.

## 2018-03-06 NOTE — DISCHARGE NOTE ADULT - INSTRUCTIONS
•	Your procedure was done through your groin and leg.   •	You do not need to keep this area covered and you may shower  •	Please avoid any heavy lifting  (no more than 3 to 5 lbs) or strenuous activity for five days  •	If you develop any swelling, bleeding, hardening of the skin (hematoma formation), acute pain, numbness/tingling  in your leg please contact your doctor immediately or call our 24/7 line: [phone]
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
## 2018-03-09 DIAGNOSIS — I70.203 UNSPECIFIED ATHEROSCLEROSIS OF NATIVE ARTERIES OF EXTREMITIES, BILATERAL LEGS: ICD-10-CM

## 2018-03-09 DIAGNOSIS — Z79.02 LONG TERM (CURRENT) USE OF ANTITHROMBOTICS/ANTIPLATELETS: ICD-10-CM

## 2018-03-09 DIAGNOSIS — I08.0 RHEUMATIC DISORDERS OF BOTH MITRAL AND AORTIC VALVES: ICD-10-CM

## 2018-03-09 DIAGNOSIS — D50.9 IRON DEFICIENCY ANEMIA, UNSPECIFIED: ICD-10-CM

## 2018-03-09 DIAGNOSIS — I70.92 CHRONIC TOTAL OCCLUSION OF ARTERY OF THE EXTREMITIES: ICD-10-CM

## 2018-03-09 DIAGNOSIS — L76.22 POSTPROCEDURAL HEMORRHAGE OF SKIN AND SUBCUTANEOUS TISSUE FOLLOWING OTHER PROCEDURE: ICD-10-CM

## 2018-03-09 DIAGNOSIS — I13.0 HYPERTENSIVE HEART AND CHRONIC KIDNEY DISEASE WITH HEART FAILURE AND STAGE 1 THROUGH STAGE 4 CHRONIC KIDNEY DISEASE, OR UNSPECIFIED CHRONIC KIDNEY DISEASE: ICD-10-CM

## 2018-03-09 DIAGNOSIS — Z95.820 PERIPHERAL VASCULAR ANGIOPLASTY STATUS WITH IMPLANTS AND GRAFTS: ICD-10-CM
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
## 2018-03-09 DIAGNOSIS — I25.10 ATHEROSCLEROTIC HEART DISEASE OF NATIVE CORONARY ARTERY WITHOUT ANGINA PECTORIS: ICD-10-CM
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
## 2018-03-09 DIAGNOSIS — I50.22 CHRONIC SYSTOLIC (CONGESTIVE) HEART FAILURE: ICD-10-CM

## 2018-03-09 DIAGNOSIS — Z86.73 PERSONAL HISTORY OF TRANSIENT ISCHEMIC ATTACK (TIA), AND CEREBRAL INFARCTION WITHOUT RESIDUAL DEFICITS: ICD-10-CM

## 2018-03-09 DIAGNOSIS — T82.856A STENOSIS OF PERIPHERAL VASCULAR STENT, INITIAL ENCOUNTER: ICD-10-CM

## 2018-03-09 DIAGNOSIS — E78.5 HYPERLIPIDEMIA, UNSPECIFIED: ICD-10-CM

## 2018-03-09 DIAGNOSIS — Z95.5 PRESENCE OF CORONARY ANGIOPLASTY IMPLANT AND GRAFT: ICD-10-CM

## 2018-03-09 DIAGNOSIS — Z79.82 LONG TERM (CURRENT) USE OF ASPIRIN: ICD-10-CM

## 2018-03-09 DIAGNOSIS — N18.3 CHRONIC KIDNEY DISEASE, STAGE 3 (MODERATE): ICD-10-CM

## 2018-04-13 ENCOUNTER — APPOINTMENT (OUTPATIENT)
Dept: VASCULAR SURGERY | Facility: CLINIC | Age: 83
End: 2018-04-13

## 2023-01-25 NOTE — PROVIDER CONTACT NOTE (CRITICAL VALUE NOTIFICATION) - SITUATION
Hgb went from 11.8 to 7.6
Implemented All Universal Safety Interventions:  [place] to call system. Call bell, personal items and telephone within reach. Instruct patient to call for assistance. Room bathroom lighting operational. Non-slip footwear when patient is off stretcher. Physically safe environment: no spills, clutter or unnecessary equipment. Stretcher in lowest position, wheels locked, appropriate side rails in place.